# Patient Record
Sex: MALE | Race: WHITE
[De-identification: names, ages, dates, MRNs, and addresses within clinical notes are randomized per-mention and may not be internally consistent; named-entity substitution may affect disease eponyms.]

---

## 2021-08-23 ENCOUNTER — APPOINTMENT (OUTPATIENT)
Dept: DISASTER EMERGENCY | Facility: CLINIC | Age: 71
End: 2021-08-23

## 2021-08-23 DIAGNOSIS — Z01.818 ENCOUNTER FOR OTHER PREPROCEDURAL EXAMINATION: ICD-10-CM

## 2021-08-23 PROBLEM — Z00.00 ENCOUNTER FOR PREVENTIVE HEALTH EXAMINATION: Status: ACTIVE | Noted: 2021-08-23

## 2021-08-24 LAB — SARS-COV-2 N GENE NPH QL NAA+PROBE: NOT DETECTED

## 2021-08-26 ENCOUNTER — RESULT REVIEW (OUTPATIENT)
Age: 71
End: 2021-08-26

## 2021-08-26 ENCOUNTER — OUTPATIENT (OUTPATIENT)
Dept: OUTPATIENT SERVICES | Facility: HOSPITAL | Age: 71
LOS: 1 days | Discharge: HOME | End: 2021-08-26
Payer: COMMERCIAL

## 2021-08-26 VITALS
RESPIRATION RATE: 23 BRPM | TEMPERATURE: 98 F | HEART RATE: 83 BPM | DIASTOLIC BLOOD PRESSURE: 79 MMHG | HEIGHT: 72 IN | SYSTOLIC BLOOD PRESSURE: 145 MMHG | WEIGHT: 179.9 LBS | OXYGEN SATURATION: 100 %

## 2021-08-26 DIAGNOSIS — Z98.890 OTHER SPECIFIED POSTPROCEDURAL STATES: Chronic | ICD-10-CM

## 2021-08-26 DIAGNOSIS — Z86.010 PERSONAL HISTORY OF COLONIC POLYPS: ICD-10-CM

## 2021-08-26 PROCEDURE — C1889: CPT

## 2021-08-26 PROCEDURE — 82962 GLUCOSE BLOOD TEST: CPT

## 2021-08-26 PROCEDURE — 88305 TISSUE EXAM BY PATHOLOGIST: CPT

## 2021-08-26 PROCEDURE — 88305 TISSUE EXAM BY PATHOLOGIST: CPT | Mod: 26

## 2021-08-26 NOTE — ASU PATIENT PROFILE, ADULT - NSICDXPASTMEDICALHX_GEN_ALL_CORE_FT
PAST MEDICAL HISTORY:  H/O type 1 diabetes mellitus     HLD (hyperlipidemia)     HTN (hypertension)     Stage 1 chronic kidney disease

## 2021-09-01 DIAGNOSIS — I12.9 HYPERTENSIVE CHRONIC KIDNEY DISEASE WITH STAGE 1 THROUGH STAGE 4 CHRONIC KIDNEY DISEASE, OR UNSPECIFIED CHRONIC KIDNEY DISEASE: ICD-10-CM

## 2021-09-01 DIAGNOSIS — K21.9 GASTRO-ESOPHAGEAL REFLUX DISEASE WITHOUT ESOPHAGITIS: ICD-10-CM

## 2021-09-01 DIAGNOSIS — Z12.11 ENCOUNTER FOR SCREENING FOR MALIGNANT NEOPLASM OF COLON: ICD-10-CM

## 2021-09-01 DIAGNOSIS — D12.0 BENIGN NEOPLASM OF CECUM: ICD-10-CM

## 2021-09-01 DIAGNOSIS — E11.22 TYPE 2 DIABETES MELLITUS WITH DIABETIC CHRONIC KIDNEY DISEASE: ICD-10-CM

## 2021-09-01 DIAGNOSIS — Z86.010 PERSONAL HISTORY OF COLONIC POLYPS: ICD-10-CM

## 2021-09-01 DIAGNOSIS — E78.5 HYPERLIPIDEMIA, UNSPECIFIED: ICD-10-CM

## 2021-09-01 DIAGNOSIS — Z79.4 LONG TERM (CURRENT) USE OF INSULIN: ICD-10-CM

## 2021-09-01 DIAGNOSIS — N18.9 CHRONIC KIDNEY DISEASE, UNSPECIFIED: ICD-10-CM

## 2022-09-29 PROBLEM — Z86.39 PERSONAL HISTORY OF OTHER ENDOCRINE, NUTRITIONAL AND METABOLIC DISEASE: Chronic | Status: ACTIVE | Noted: 2021-08-26

## 2022-09-29 PROBLEM — E78.5 HYPERLIPIDEMIA, UNSPECIFIED: Chronic | Status: ACTIVE | Noted: 2021-08-26

## 2022-09-29 PROBLEM — I10 ESSENTIAL (PRIMARY) HYPERTENSION: Chronic | Status: ACTIVE | Noted: 2021-08-26

## 2022-09-29 PROBLEM — N18.1 CHRONIC KIDNEY DISEASE, STAGE 1: Chronic | Status: ACTIVE | Noted: 2021-08-26

## 2022-10-13 ENCOUNTER — OUTPATIENT (OUTPATIENT)
Dept: OUTPATIENT SERVICES | Facility: HOSPITAL | Age: 72
LOS: 1 days | Discharge: HOME | End: 2022-10-13
Payer: MEDICARE

## 2022-10-13 ENCOUNTER — RESULT REVIEW (OUTPATIENT)
Age: 72
End: 2022-10-13

## 2022-10-13 VITALS
TEMPERATURE: 98 F | WEIGHT: 190.04 LBS | RESPIRATION RATE: 20 BRPM | DIASTOLIC BLOOD PRESSURE: 84 MMHG | HEART RATE: 90 BPM | OXYGEN SATURATION: 100 % | SYSTOLIC BLOOD PRESSURE: 152 MMHG

## 2022-10-13 DIAGNOSIS — K21.9 GASTRO-ESOPHAGEAL REFLUX DISEASE WITHOUT ESOPHAGITIS: ICD-10-CM

## 2022-10-13 DIAGNOSIS — Z86.010 PERSONAL HISTORY OF COLONIC POLYPS: ICD-10-CM

## 2022-10-13 DIAGNOSIS — Z98.890 OTHER SPECIFIED POSTPROCEDURAL STATES: Chronic | ICD-10-CM

## 2022-10-13 PROCEDURE — 88305 TISSUE EXAM BY PATHOLOGIST: CPT

## 2022-10-13 PROCEDURE — 88305 TISSUE EXAM BY PATHOLOGIST: CPT | Mod: 26

## 2022-10-13 PROCEDURE — 88312 SPECIAL STAINS GROUP 1: CPT

## 2022-10-13 PROCEDURE — 88312 SPECIAL STAINS GROUP 1: CPT | Mod: 26

## 2022-10-13 RX ORDER — OMEPRAZOLE 10 MG/1
1 CAPSULE, DELAYED RELEASE ORAL
Qty: 0 | Refills: 0 | DISCHARGE

## 2022-10-13 RX ORDER — ENOXAPARIN SODIUM 100 MG/ML
6 INJECTION SUBCUTANEOUS
Qty: 0 | Refills: 0 | DISCHARGE

## 2022-10-13 RX ORDER — CHOLECALCIFEROL (VITAMIN D3) 125 MCG
1 CAPSULE ORAL
Qty: 0 | Refills: 0 | DISCHARGE

## 2022-10-13 RX ORDER — OMEGA-3 ACID ETHYL ESTERS 1 G
1 CAPSULE ORAL
Qty: 0 | Refills: 0 | DISCHARGE

## 2022-10-13 RX ORDER — LOSARTAN POTASSIUM 100 MG/1
1 TABLET, FILM COATED ORAL
Qty: 0 | Refills: 0 | DISCHARGE

## 2022-10-13 RX ORDER — HYDROXYCHLOROQUINE SULFATE 200 MG
1 TABLET ORAL
Qty: 0 | Refills: 0 | DISCHARGE

## 2022-10-13 RX ORDER — INSULIN ASPART 100 [IU]/ML
8 INJECTION, SOLUTION SUBCUTANEOUS
Qty: 0 | Refills: 0 | DISCHARGE

## 2022-10-13 NOTE — CHART NOTE - NSCHARTNOTEFT_GEN_A_CORE
Attending Physician:  Dirk Day MD                       Procedure: EGD/colonoscopy    Indication for Procedure: Dysphagia, colon polyps  ________________________________________________________  PAST MEDICAL & SURGICAL HISTORY:  Stage 1 chronic kidney disease      H/O type 1 diabetes mellitus      HLD (hyperlipidemia)      HTN (hypertension)      H/O knee surgery        ALLERGIES:  No Known Allergies    HOME MEDICATIONS:  Cialis 5 mg oral tablet: 1 tab(s) orally once a day  Cozaar 25 mg oral tablet: 1 tab(s) orally once a day  Fish Oil oral capsule: 1 cap(s) orally once a day  Lantus Solostar Pen 100 units/mL subcutaneous solution: 6 unit(s) subcutaneous once a day  Lipitor 10 mg oral tablet: 1 tab(s) orally once a day  NovoLOG FlexPen 100 units/mL injectable solution: 8 unit(s) injectable 3 times a day  omeprazole 40 mg oral delayed release capsule: 1 cap(s) orally once a day  Plaquenil 200 mg oral tablet: 1 tab(s) orally once a day  Tylenol PM 1000 mg-50 mg/30 mL oral liquid: 30 milliliter(s) orally once a day (at bedtime)  Vitamin D3 25 mcg (1000 intl units) oral capsule: 1 cap(s) orally once a day    AICD/PPM: [ ] yes   [ ] no    PERTINENT LAB DATA:      PHYSICAL EXAMINATION:      Weight (kg): 86.2T(C): 36.9  HR: 90  BP: 152/84  RR: 20  SpO2: 100%    Constitutional: NAD    Neck:  No JVD  Respiratory: CTAB/L  Cardiovascular: S1 and S2  Gastrointestinal: BS+, soft, NT/ND  Extremities: No peripheral edema  Neurological: A/O x 3, no focal deficits        COMMENTS:    The patient is a suitable candidate for the planned procedure unless box checked [ ]  No, explain:

## 2022-10-17 LAB — SURGICAL PATHOLOGY STUDY: SIGNIFICANT CHANGE UP

## 2022-10-18 DIAGNOSIS — E78.00 PURE HYPERCHOLESTEROLEMIA, UNSPECIFIED: ICD-10-CM

## 2022-10-18 DIAGNOSIS — D12.5 BENIGN NEOPLASM OF SIGMOID COLON: ICD-10-CM

## 2022-10-18 DIAGNOSIS — I12.9 HYPERTENSIVE CHRONIC KIDNEY DISEASE WITH STAGE 1 THROUGH STAGE 4 CHRONIC KIDNEY DISEASE, OR UNSPECIFIED CHRONIC KIDNEY DISEASE: ICD-10-CM

## 2022-10-18 DIAGNOSIS — K64.0 FIRST DEGREE HEMORRHOIDS: ICD-10-CM

## 2022-10-18 DIAGNOSIS — Z86.010 PERSONAL HISTORY OF COLONIC POLYPS: ICD-10-CM

## 2022-10-18 DIAGNOSIS — Z79.4 LONG TERM (CURRENT) USE OF INSULIN: ICD-10-CM

## 2022-10-18 DIAGNOSIS — K44.9 DIAPHRAGMATIC HERNIA WITHOUT OBSTRUCTION OR GANGRENE: ICD-10-CM

## 2022-10-18 DIAGNOSIS — E11.40 TYPE 2 DIABETES MELLITUS WITH DIABETIC NEUROPATHY, UNSPECIFIED: ICD-10-CM

## 2022-10-18 DIAGNOSIS — R13.10 DYSPHAGIA, UNSPECIFIED: ICD-10-CM

## 2022-10-18 DIAGNOSIS — K29.50 UNSPECIFIED CHRONIC GASTRITIS WITHOUT BLEEDING: ICD-10-CM

## 2022-10-18 DIAGNOSIS — D12.2 BENIGN NEOPLASM OF ASCENDING COLON: ICD-10-CM

## 2022-10-18 DIAGNOSIS — E11.22 TYPE 2 DIABETES MELLITUS WITH DIABETIC CHRONIC KIDNEY DISEASE: ICD-10-CM

## 2022-10-18 DIAGNOSIS — K21.9 GASTRO-ESOPHAGEAL REFLUX DISEASE WITHOUT ESOPHAGITIS: ICD-10-CM

## 2022-10-18 DIAGNOSIS — N18.9 CHRONIC KIDNEY DISEASE, UNSPECIFIED: ICD-10-CM

## 2022-11-05 ENCOUNTER — INPATIENT (INPATIENT)
Facility: HOSPITAL | Age: 72
LOS: 1 days | Discharge: HOME | DRG: 247 | End: 2022-11-07
Attending: INTERNAL MEDICINE | Admitting: INTERNAL MEDICINE
Payer: MEDICARE

## 2022-11-05 VITALS — WEIGHT: 190.04 LBS | HEIGHT: 72 IN

## 2022-11-05 DIAGNOSIS — N17.9 ACUTE KIDNEY FAILURE, UNSPECIFIED: ICD-10-CM

## 2022-11-05 DIAGNOSIS — I21.3 ST ELEVATION (STEMI) MYOCARDIAL INFARCTION OF UNSPECIFIED SITE: ICD-10-CM

## 2022-11-05 DIAGNOSIS — Z98.890 OTHER SPECIFIED POSTPROCEDURAL STATES: Chronic | ICD-10-CM

## 2022-11-05 DIAGNOSIS — R77.8 OTHER SPECIFIED ABNORMALITIES OF PLASMA PROTEINS: ICD-10-CM

## 2022-11-05 LAB
ALBUMIN SERPL ELPH-MCNC: 4 G/DL — SIGNIFICANT CHANGE UP (ref 3.3–5)
ALP SERPL-CCNC: 73 U/L — SIGNIFICANT CHANGE UP (ref 40–120)
ALT FLD-CCNC: 40 U/L — SIGNIFICANT CHANGE UP (ref 12–78)
ANION GAP SERPL CALC-SCNC: 7 MMOL/L — SIGNIFICANT CHANGE UP (ref 5–17)
APTT BLD: 32.4 SEC — SIGNIFICANT CHANGE UP (ref 27.5–35.5)
AST SERPL-CCNC: 121 U/L — HIGH (ref 15–37)
BASOPHILS # BLD AUTO: 0.02 K/UL — SIGNIFICANT CHANGE UP (ref 0–0.2)
BASOPHILS NFR BLD AUTO: 0.2 % — SIGNIFICANT CHANGE UP (ref 0–2)
BILIRUB SERPL-MCNC: 0.5 MG/DL — SIGNIFICANT CHANGE UP (ref 0.2–1.2)
BUN SERPL-MCNC: 30 MG/DL — HIGH (ref 7–23)
CALCIUM SERPL-MCNC: 9.3 MG/DL — SIGNIFICANT CHANGE UP (ref 8.5–10.1)
CHLORIDE SERPL-SCNC: 103 MMOL/L — SIGNIFICANT CHANGE UP (ref 96–108)
CO2 SERPL-SCNC: 25 MMOL/L — SIGNIFICANT CHANGE UP (ref 22–31)
CREAT SERPL-MCNC: 1.71 MG/DL — HIGH (ref 0.5–1.3)
EGFR: 42 ML/MIN/1.73M2 — LOW
EOSINOPHIL # BLD AUTO: 0.04 K/UL — SIGNIFICANT CHANGE UP (ref 0–0.5)
EOSINOPHIL NFR BLD AUTO: 0.4 % — SIGNIFICANT CHANGE UP (ref 0–6)
FLUAV AG NPH QL: SIGNIFICANT CHANGE UP
FLUBV AG NPH QL: SIGNIFICANT CHANGE UP
GLUCOSE SERPL-MCNC: 270 MG/DL — HIGH (ref 70–99)
HCT VFR BLD CALC: 41.6 % — SIGNIFICANT CHANGE UP (ref 39–50)
HGB BLD-MCNC: 13.5 G/DL — SIGNIFICANT CHANGE UP (ref 13–17)
IMM GRANULOCYTES NFR BLD AUTO: 0.3 % — SIGNIFICANT CHANGE UP (ref 0–0.9)
INR BLD: 1.13 RATIO — SIGNIFICANT CHANGE UP (ref 0.88–1.16)
LYMPHOCYTES # BLD AUTO: 0.92 K/UL — LOW (ref 1–3.3)
LYMPHOCYTES # BLD AUTO: 9.2 % — LOW (ref 13–44)
MAGNESIUM SERPL-MCNC: 2.2 MG/DL — SIGNIFICANT CHANGE UP (ref 1.6–2.6)
MCHC RBC-ENTMCNC: 30.5 PG — SIGNIFICANT CHANGE UP (ref 27–34)
MCHC RBC-ENTMCNC: 32.5 GM/DL — SIGNIFICANT CHANGE UP (ref 32–36)
MCV RBC AUTO: 93.9 FL — SIGNIFICANT CHANGE UP (ref 80–100)
MONOCYTES # BLD AUTO: 0.72 K/UL — SIGNIFICANT CHANGE UP (ref 0–0.9)
MONOCYTES NFR BLD AUTO: 7.2 % — SIGNIFICANT CHANGE UP (ref 2–14)
NEUTROPHILS # BLD AUTO: 8.31 K/UL — HIGH (ref 1.8–7.4)
NEUTROPHILS NFR BLD AUTO: 82.7 % — HIGH (ref 43–77)
NT-PROBNP SERPL-SCNC: 3081 PG/ML — HIGH (ref 0–125)
PLATELET # BLD AUTO: 249 K/UL — SIGNIFICANT CHANGE UP (ref 150–400)
POTASSIUM SERPL-MCNC: 4 MMOL/L — SIGNIFICANT CHANGE UP (ref 3.5–5.3)
POTASSIUM SERPL-SCNC: 4 MMOL/L — SIGNIFICANT CHANGE UP (ref 3.5–5.3)
PROT SERPL-MCNC: 8.6 GM/DL — HIGH (ref 6–8.3)
PROTHROM AB SERPL-ACNC: 13.1 SEC — SIGNIFICANT CHANGE UP (ref 10.5–13.4)
RBC # BLD: 4.43 M/UL — SIGNIFICANT CHANGE UP (ref 4.2–5.8)
RBC # FLD: 12.7 % — SIGNIFICANT CHANGE UP (ref 10.3–14.5)
RSV RNA NPH QL NAA+NON-PROBE: SIGNIFICANT CHANGE UP
SARS-COV-2 RNA SPEC QL NAA+PROBE: SIGNIFICANT CHANGE UP
SODIUM SERPL-SCNC: 135 MMOL/L — SIGNIFICANT CHANGE UP (ref 135–145)
TROPONIN I, HIGH SENSITIVITY RESULT: HIGH NG/L
TROPONIN I, HIGH SENSITIVITY RESULT: HIGH NG/L
WBC # BLD: 10.04 K/UL — SIGNIFICANT CHANGE UP (ref 3.8–10.5)
WBC # FLD AUTO: 10.04 K/UL — SIGNIFICANT CHANGE UP (ref 3.8–10.5)

## 2022-11-05 PROCEDURE — C9606: CPT | Mod: RC

## 2022-11-05 PROCEDURE — 80061 LIPID PANEL: CPT

## 2022-11-05 PROCEDURE — 85027 COMPLETE CBC AUTOMATED: CPT

## 2022-11-05 PROCEDURE — 36415 COLL VENOUS BLD VENIPUNCTURE: CPT

## 2022-11-05 PROCEDURE — 82962 GLUCOSE BLOOD TEST: CPT

## 2022-11-05 PROCEDURE — 85025 COMPLETE CBC W/AUTO DIFF WBC: CPT

## 2022-11-05 PROCEDURE — 93005 ELECTROCARDIOGRAM TRACING: CPT

## 2022-11-05 PROCEDURE — 93306 TTE W/DOPPLER COMPLETE: CPT | Mod: 26

## 2022-11-05 PROCEDURE — 84100 ASSAY OF PHOSPHORUS: CPT

## 2022-11-05 PROCEDURE — 80048 BASIC METABOLIC PNL TOTAL CA: CPT

## 2022-11-05 PROCEDURE — 82550 ASSAY OF CK (CPK): CPT

## 2022-11-05 PROCEDURE — 71045 X-RAY EXAM CHEST 1 VIEW: CPT | Mod: 26

## 2022-11-05 PROCEDURE — 99285 EMERGENCY DEPT VISIT HI MDM: CPT

## 2022-11-05 PROCEDURE — 83036 HEMOGLOBIN GLYCOSYLATED A1C: CPT

## 2022-11-05 PROCEDURE — 99232 SBSQ HOSP IP/OBS MODERATE 35: CPT

## 2022-11-05 PROCEDURE — 93306 TTE W/DOPPLER COMPLETE: CPT

## 2022-11-05 PROCEDURE — 84484 ASSAY OF TROPONIN QUANT: CPT

## 2022-11-05 PROCEDURE — 93458 L HRT ARTERY/VENTRICLE ANGIO: CPT | Mod: 59

## 2022-11-05 PROCEDURE — 83735 ASSAY OF MAGNESIUM: CPT

## 2022-11-05 PROCEDURE — 93010 ELECTROCARDIOGRAM REPORT: CPT | Mod: 76

## 2022-11-05 PROCEDURE — 71045 X-RAY EXAM CHEST 1 VIEW: CPT

## 2022-11-05 RX ORDER — GLUCAGON INJECTION, SOLUTION 0.5 MG/.1ML
1 INJECTION, SOLUTION SUBCUTANEOUS ONCE
Refills: 0 | Status: DISCONTINUED | OUTPATIENT
Start: 2022-11-05 | End: 2022-11-07

## 2022-11-05 RX ORDER — DEXTROSE 50 % IN WATER 50 %
15 SYRINGE (ML) INTRAVENOUS ONCE
Refills: 0 | Status: DISCONTINUED | OUTPATIENT
Start: 2022-11-05 | End: 2022-11-07

## 2022-11-05 RX ORDER — ATORVASTATIN CALCIUM 80 MG/1
80 TABLET, FILM COATED ORAL AT BEDTIME
Refills: 0 | Status: DISCONTINUED | OUTPATIENT
Start: 2022-11-05 | End: 2022-11-07

## 2022-11-05 RX ORDER — SODIUM CHLORIDE 9 MG/ML
1000 INJECTION INTRAMUSCULAR; INTRAVENOUS; SUBCUTANEOUS
Refills: 0 | Status: DISCONTINUED | OUTPATIENT
Start: 2022-11-05 | End: 2022-11-07

## 2022-11-05 RX ORDER — DEXTROSE 50 % IN WATER 50 %
25 SYRINGE (ML) INTRAVENOUS ONCE
Refills: 0 | Status: DISCONTINUED | OUTPATIENT
Start: 2022-11-05 | End: 2022-11-07

## 2022-11-05 RX ORDER — CLOPIDOGREL BISULFATE 75 MG/1
75 TABLET, FILM COATED ORAL DAILY
Refills: 0 | Status: DISCONTINUED | OUTPATIENT
Start: 2022-11-06 | End: 2022-11-07

## 2022-11-05 RX ORDER — ASPIRIN/CALCIUM CARB/MAGNESIUM 324 MG
81 TABLET ORAL DAILY
Refills: 0 | Status: DISCONTINUED | OUTPATIENT
Start: 2022-11-06 | End: 2022-11-07

## 2022-11-05 RX ORDER — METOPROLOL TARTRATE 50 MG
50 TABLET ORAL
Refills: 0 | Status: DISCONTINUED | OUTPATIENT
Start: 2022-11-05 | End: 2022-11-06

## 2022-11-05 RX ORDER — INSULIN GLARGINE 100 [IU]/ML
10 INJECTION, SOLUTION SUBCUTANEOUS ONCE
Refills: 0 | Status: COMPLETED | OUTPATIENT
Start: 2022-11-05 | End: 2022-11-05

## 2022-11-05 RX ORDER — ASPIRIN/CALCIUM CARB/MAGNESIUM 324 MG
324 TABLET ORAL ONCE
Refills: 0 | Status: COMPLETED | OUTPATIENT
Start: 2022-11-05 | End: 2022-11-05

## 2022-11-05 RX ORDER — DEXTROSE 50 % IN WATER 50 %
12.5 SYRINGE (ML) INTRAVENOUS ONCE
Refills: 0 | Status: DISCONTINUED | OUTPATIENT
Start: 2022-11-05 | End: 2022-11-07

## 2022-11-05 RX ORDER — CLOPIDOGREL BISULFATE 75 MG/1
300 TABLET, FILM COATED ORAL ONCE
Refills: 0 | Status: COMPLETED | OUTPATIENT
Start: 2022-11-05 | End: 2022-11-05

## 2022-11-05 RX ORDER — INSULIN LISPRO 100/ML
VIAL (ML) SUBCUTANEOUS
Refills: 0 | Status: DISCONTINUED | OUTPATIENT
Start: 2022-11-05 | End: 2022-11-06

## 2022-11-05 RX ORDER — SODIUM CHLORIDE 9 MG/ML
1000 INJECTION, SOLUTION INTRAVENOUS
Refills: 0 | Status: DISCONTINUED | OUTPATIENT
Start: 2022-11-05 | End: 2022-11-07

## 2022-11-05 RX ORDER — HEPARIN SODIUM 5000 [USP'U]/ML
5000 INJECTION INTRAVENOUS; SUBCUTANEOUS ONCE
Refills: 0 | Status: COMPLETED | OUTPATIENT
Start: 2022-11-05 | End: 2022-11-05

## 2022-11-05 RX ORDER — INSULIN LISPRO 100/ML
VIAL (ML) SUBCUTANEOUS AT BEDTIME
Refills: 0 | Status: DISCONTINUED | OUTPATIENT
Start: 2022-11-05 | End: 2022-11-06

## 2022-11-05 RX ORDER — LANOLIN ALCOHOL/MO/W.PET/CERES
5 CREAM (GRAM) TOPICAL AT BEDTIME
Refills: 0 | Status: DISCONTINUED | OUTPATIENT
Start: 2022-11-05 | End: 2022-11-07

## 2022-11-05 RX ADMIN — HEPARIN SODIUM 5000 UNIT(S): 5000 INJECTION INTRAVENOUS; SUBCUTANEOUS at 12:12

## 2022-11-05 RX ADMIN — ATORVASTATIN CALCIUM 80 MILLIGRAM(S): 80 TABLET, FILM COATED ORAL at 21:35

## 2022-11-05 RX ADMIN — Medication 324 MILLIGRAM(S): at 12:12

## 2022-11-05 RX ADMIN — INSULIN GLARGINE 10 UNIT(S): 100 INJECTION, SOLUTION SUBCUTANEOUS at 22:16

## 2022-11-05 RX ADMIN — SODIUM CHLORIDE 100 MILLILITER(S): 9 INJECTION INTRAMUSCULAR; INTRAVENOUS; SUBCUTANEOUS at 14:57

## 2022-11-05 RX ADMIN — Medication 5 MILLIGRAM(S): at 21:35

## 2022-11-05 RX ADMIN — CLOPIDOGREL BISULFATE 300 MILLIGRAM(S): 75 TABLET, FILM COATED ORAL at 12:20

## 2022-11-05 RX ADMIN — CLOPIDOGREL BISULFATE 300 MILLIGRAM(S): 75 TABLET, FILM COATED ORAL at 12:12

## 2022-11-05 RX ADMIN — Medication 6: at 18:47

## 2022-11-05 NOTE — ED PROVIDER NOTE - OBJECTIVE STATEMENT
73 y/o male with a PMHx of DM presents to the ED for CP for 3 days. Pt states that at 10PM yesterday night pain has increased and radiated down his left arm. No history of heart disease. Pt believes that he does not take any anticoagulants. Found to have a STEMI on EKG in ED.  GARRETT 71 y/o male with a PMHx of DM presents to the ED for CP for 3 days. Pt states that at 10PM yesterday night, pain has increased and radiated down his left arm. No history of heart disease. Pt believes that he does not take any anticoagulants. Found to have a STEMI on EKG in ED.  GARRETT

## 2022-11-05 NOTE — PATIENT PROFILE ADULT - FALL HARM RISK - UNIVERSAL INTERVENTIONS
Bed in lowest position, wheels locked, appropriate side rails in place/Call bell, personal items and telephone in reach/Instruct patient to call for assistance before getting out of bed or chair/Non-slip footwear when patient is out of bed/[place] to call system/Physically safe environment - no spills, clutter or unnecessary equipment/Purposeful Proactive Rounding/Room/bathroom lighting operational, light cord in reach

## 2022-11-05 NOTE — ED ADULT TRIAGE NOTE - PRO INTERPRETER NEED 2
Health Maintenance Due   Topic Date Due    TETANUS VACCINE  07/10/1985    Pneumococcal PPSV23 (Medium Risk) (1) 07/10/1985    Mammogram  07/10/2007    Lipid Panel  07/16/2012    Colonoscopy  07/10/2017    Influenza Vaccine  08/01/2018       
English

## 2022-11-05 NOTE — ED PROVIDER NOTE - PROGRESS NOTE DETAILS
Sundeep Welsh: Dr. Santos made aware and is coming down in hospital. patient to go for cath, waiting on nursing upstairs to be ready. dr. umaña rec heparin/asa/plavix. Dirk Welsh MD.

## 2022-11-05 NOTE — PROGRESS NOTE ADULT - SUBJECTIVE AND OBJECTIVE BOX
72y old  Male who presents with a chief complaint of chest pain. Off and on for the last 2-3 days but worse last night and today AM. Came to ER and EKG showed ST elevations in the inferior leads.   Case discussed- s/p 3 stents RCA      ICU Vital Signs Last 24 Hrs  T(C): 36.1 (05 Nov 2022 13:50), Max: 36.9 (05 Nov 2022 11:34)  T(F): 97 (05 Nov 2022 13:50), Max: 98.5 (05 Nov 2022 11:34)  HR: 74 (05 Nov 2022 14:20) (73 - 84)  BP: 102/57 (05 Nov 2022 14:20) (102/57 - 149/81)  BP(mean): 63 (05 Nov 2022 14:20) (63 - 101)  RR: 11 (05 Nov 2022 13:50) (11 - 16)  SpO2: 95% (05 Nov 2022 14:20) (95% - 100%)    O2 Parameters below as of 05 Nov 2022 11:34  Patient On (Oxygen Delivery Method): room air                            13.5   10.04 )-----------( 249      ( 05 Nov 2022 11:47 )             41.6         11-05    135  |  103  |  30<H>  ----------------------------<  270<H>  4.0   |  25  |  1.71<H>    Ca    9.3      05 Nov 2022 11:47  Mg     2.2     11-05    TPro  8.6<H>  /  Alb  4.0  /  TBili  0.5  /  DBili  x   /  AST  121<H>  /  ALT  40  /  AlkPhos  73  11-05    LIVER FUNCTIONS - ( 05 Nov 2022 11:47 )  Alb: 4.0 g/dL / Pro: 8.6 gm/dL / ALK PHOS: 73 U/L / ALT: 40 U/L / AST: 121 U/L / GGT: x             PT/INR - ( 05 Nov 2022 11:47 )   PT: 13.1 sec;   INR: 1.13 ratio         PTT - ( 05 Nov 2022 11:47 )  PTT:32.4 sec

## 2022-11-05 NOTE — ED PROVIDER NOTE - PHYSICAL EXAMINATION
I have reviewed discharge instructions with the patient. The patient verbalized understanding. Discharge medications reviewed with patient and appropriate educational materials and side effects teaching were provided. Patient armband removed and shredded Constitutional: Awake, Alert, non-toxic. No acute distress. Well appearing, well nourished.   HEAD: Normocephalic, atraumatic.   EYES: PERRL, EOM intact, conjunctiva and sclera are clear bilaterally.  ENT: External ears normal. No rhinorrhea, no tracheal deviation   NECK: Supple, non-tender  CARDIOVASCULAR: regular rate and rhythm.  RESPIRATORY: Normal respiratory effort; breath sounds CTAB, no wheezes, rhonchi, or rales. Speaking in full sentences. No accessory muscle use.   ABDOMEN: Soft; non-tender, non-distended. No rebound or guarding.   EXTREMITIES:  no lower extremity edema, no deformities  SKIN: Warm, dry  NEURO: A&O x3. Sensory and motor functions are grossly intact. Speech is normal. No facial droop.  PSYCH: Appearance and judgement seem appropriate for gender and age.

## 2022-11-05 NOTE — CONSULT NOTE ADULT - SUBJECTIVE AND OBJECTIVE BOX
Patient is a 72y old  Male who presents with a chief complaint of     HPI:      PAST MEDICAL & SURGICAL HISTORY:  Stage 1 chronic kidney disease      H/O type 1 diabetes mellitus      HLD (hyperlipidemia)      HTN (hypertension)      H/O knee surgery          PREVIOUS CARDIAC WORKUP:      Echo:  Stress Test:  Cardiac Cath:    ALLERGIES:    No Known Allergies       MEDICATIONS  (STANDING):  aspirin  chewable 324 milliGRAM(s) Oral once  clopidogrel Tablet 300 milliGRAM(s) Oral Once  clopidogrel Tablet 300 milliGRAM(s) Oral Once  heparin   Injectable 5000 Unit(s) IV Push once    MEDICATIONS  (PRN):      FAMILY HISTORY:        SOCIAL HISTORY:  .scl     ROS:     .ros    Vital Signs Last 24 Hrs  T(C): 36.9 (05 Nov 2022 11:34), Max: 36.9 (05 Nov 2022 11:34)  T(F): 98.5 (05 Nov 2022 11:34), Max: 98.5 (05 Nov 2022 11:34)  HR: 84 (05 Nov 2022 11:34) (84 - 84)  BP: 149/81 (05 Nov 2022 11:34) (149/81 - 149/81)  BP(mean): 101 (05 Nov 2022 11:34) (101 - 101)  RR: 16 (05 Nov 2022 11:34) (16 - 16)  SpO2: 100% (05 Nov 2022 11:34) (100% - 100%)    Parameters below as of 05 Nov 2022 11:34  Patient On (Oxygen Delivery Method): room air        I&O's Summary      PHYSICAL EXAM:    .phy      TELEMETRY:    ECG:    LABS:                          13.5   10.04 )-----------( 249      ( 05 Nov 2022 11:47 )             41.6                                 RADIOLOGY & ADDITIONAL STUDIES:     Patient is a 72y old  Male who presents with a chief complaint of chest pain. Off and on for the last 2-3 days but worse last night and today AM. Came to ER and EKG showed ST elevations in the inferior leads.       PAST MEDICAL & SURGICAL HISTORY:  Stage 1 chronic kidney disease  H/O type 1 diabetes mellitus  HLD (hyperlipidemia)  HTN (hypertension)  H/O knee surgery      PREVIOUS CARDIAC WORKUP:    None      ALLERGIES:    No Known Allergies       MEDICATIONS  (STANDING):  aspirin  chewable 324 milliGRAM(s) Oral once  clopidogrel Tablet 300 milliGRAM(s) Oral Once  clopidogrel Tablet 300 milliGRAM(s) Oral Once  heparin   Injectable 5000 Unit(s) IV Push once    MEDICATIONS  (PRN):      FAMILY HISTORY:   Positive for early onset CAD         SOCIAL HISTORY:  Nonsmoker. No ETOH abuse. No illicit drugs.       ROS:     Detailed ten system ROS was performed and was negative except for history as eluded to above.    no fever  no chills  no nausea  no vomiting  no diarrhea  no constipation  no melena  no hematochezia  + chest pain  no palpitations  no sob at rest  no dyspnea on exertion  no cough  no wheezing  no anorexia  no headache  no dizziness  no syncope  no weakness  no myalgia  no dysuria  no polyuria  no hematuria         Vital Signs Last 24 Hrs  T(C): 36.9 (05 Nov 2022 11:34), Max: 36.9 (05 Nov 2022 11:34)  T(F): 98.5 (05 Nov 2022 11:34), Max: 98.5 (05 Nov 2022 11:34)  HR: 84 (05 Nov 2022 11:34) (84 - 84)  BP: 149/81 (05 Nov 2022 11:34) (149/81 - 149/81)  BP(mean): 101 (05 Nov 2022 11:34) (101 - 101)  RR: 16 (05 Nov 2022 11:34) (16 - 16)  SpO2: 100% (05 Nov 2022 11:34) (100% - 100%)    Parameters below as of 05 Nov 2022 11:34  Patient On (Oxygen Delivery Method): room air        PHYSICAL EXAM:    General:                Comfortable, AAO X 3, in no distress.   HEENT:                  Atraumatic, PERRLA, EOMI, conjunctiva clear.   Neck:                     Supple, no adenopathy, no thyromegaly, no JVD, no bruit.  Chest:                    Clear, B/L symmetric air entry, no tachypnea  Heart:                     S1, S2 normal, no gallop, no murmur.  Abdomen:              Soft, non-tender, bowel sounds active. No palpable masses.  Extremities:           no cyanosis, no edema. Peripheral pulses normal.  Skin:                      Skin color, texture normal. No rashes.  Neurologic:            Grossly nonfocal.       TELEMETRY:   Normal sinus rhythm with no tachy or osmin events     ECG:   Sinus. Inferior Q waves, ST elevation    LABS:                          13.5   10.04 )-----------( 249      ( 05 Nov 2022 11:47 )             41.6

## 2022-11-05 NOTE — CONSULT NOTE ADULT - ASSESSMENT
Inferior STEMI  DM  HTN  HLD    Suggest:    Cardiac monitor  O2 supplement  Treat with aspirin, Plavix  IV Heparin bolus given in ER  Beta blockers  Based on pt's symptoms, history, risk factors, exam, lab and EKG data I have advised pt have a cardiac catheterization and possible percutaneous coronary intervention. Procedure, its risks, alternatives, benefits and potential complications were discussed in detail. Risks include but not limited to bleeding, infection, allergy, renal failure requiring dialysis, stroke, vascular injury, pericardial tamponade, arrhythmias, MI and even death. Pt is agreeable and has consented for the procedure and the procedure is being scheduled urgently.  Further treatment orders after cardiac cath, meanwhile continue current treatment with aspirin and plavix. Keep pain free with Morphine as needed and tolerated. Keep optimal BP and HR.  Admit to CCU.   Follow up Cardiac enzymes  Statins  DAPT after PCI performed  Echocardiogram  Inferior STEMI  DM  HTN  HLD    Suggest:    Cardiac monitor  O2 supplement  Treat with aspirin, Plavix  IV Heparin bolus given in ER  Beta blockers  Based on pt's symptoms, history, risk factors, exam, lab and EKG data I have advised pt have a cardiac catheterization and possible percutaneous coronary intervention. Procedure, its risks, alternatives, benefits and potential complications were discussed in detail. Risks include but not limited to bleeding, infection, allergy, renal failure requiring dialysis, stroke, vascular injury, pericardial tamponade, arrhythmias, MI and even death. Pt is agreeable and has consented for the procedure and the procedure is being scheduled urgently.  Further treatment orders after cardiac cath, meanwhile continue current treatment with aspirin and plavix. Keep pain free with Morphine as needed and tolerated. Keep optimal BP and HR.  H/O CKD. Follow renal functio. Pt understands risk of worsening renal function and even HD after cardiac cath.   Admit to CCU.   Follow up Cardiac enzymes  Statins  DAPT after PCI performed  Echocardiogram   D/W pt's wife at bedside.

## 2022-11-05 NOTE — ED ADULT TRIAGE NOTE - CHIEF COMPLAINT QUOTE
Pt comes to the ED complaining of chest pain x 3 days. Pt states that since 10pm last night the pain has been increased.

## 2022-11-05 NOTE — ED PROVIDER NOTE - NS ED ROS FT
Constitutional: negative for fevers/chills  HENT: no hearing problems, sore throat, nasal congestion  Eyes: no visual disturbances  Neck: no neck stiffness or neck pain  Cardiovascular: (+) chest pain   Respiratory: no cough, no shortness of breath  Musculoskeletal: no back pain, no pain of extremities  Gastrointestinal: no abdominal pain, nausea, vomiting  : no dysuria or hematuria or polyuria  Neuro: no headaches, no numbness or tingling, no dizziness  Skin: no rashes or wounds

## 2022-11-05 NOTE — ED PROVIDER NOTE - CLINICAL SUMMARY MEDICAL DECISION MAKING FREE TEXT BOX
Patient presents via EMS for c/o nausea and vomiting. Patient has heartmate 2 LVAD. Patient and wife report nausea and vomiting since yesterday morning. Wife also reports temp of 100 this morning, took tylenol around 0630. Temp 98.3 during triage. Patient has abdominal incision with wound vac, completed course of antibiotics last month.  per EMS.   Pt found to have inferior STEMI on EKG. Cardiology made aware upon initial evaluation. Pt's vital signs stable at this time. Hospitalist also at bedside to evaluate pt. Likely to go to cath lab today. Will hold Asprin, antiplatelet agent, heparin pending interventional cardiology recommendation. Pt found to have inferior STEMI on EKG. Cardiology made aware upon initial evaluation. Pt's vital signs stable at this time. Hospitalist also at bedside to evaluate pt. Likely to go to cath lab from ed. Will hold Asprin, antiplatelet agent, heparin pending interventional cardiology recommendation.

## 2022-11-06 LAB
A1C WITH ESTIMATED AVERAGE GLUCOSE RESULT: 7 % — HIGH (ref 4–5.6)
ANION GAP SERPL CALC-SCNC: 4 MMOL/L — LOW (ref 5–17)
BUN SERPL-MCNC: 25 MG/DL — HIGH (ref 7–23)
CALCIUM SERPL-MCNC: 8.3 MG/DL — LOW (ref 8.5–10.1)
CHLORIDE SERPL-SCNC: 107 MMOL/L — SIGNIFICANT CHANGE UP (ref 96–108)
CHOLEST SERPL-MCNC: 127 MG/DL — SIGNIFICANT CHANGE UP
CO2 SERPL-SCNC: 27 MMOL/L — SIGNIFICANT CHANGE UP (ref 22–31)
CREAT SERPL-MCNC: 1.47 MG/DL — HIGH (ref 0.5–1.3)
EGFR: 50 ML/MIN/1.73M2 — LOW
ESTIMATED AVERAGE GLUCOSE: 154 MG/DL — HIGH (ref 68–114)
GLUCOSE SERPL-MCNC: 224 MG/DL — HIGH (ref 70–99)
HCT VFR BLD CALC: 32.9 % — LOW (ref 39–50)
HDLC SERPL-MCNC: 64 MG/DL — SIGNIFICANT CHANGE UP
HGB BLD-MCNC: 10.9 G/DL — LOW (ref 13–17)
LIPID PNL WITH DIRECT LDL SERPL: 52 MG/DL — SIGNIFICANT CHANGE UP
MCHC RBC-ENTMCNC: 31.2 PG — SIGNIFICANT CHANGE UP (ref 27–34)
MCHC RBC-ENTMCNC: 33.1 GM/DL — SIGNIFICANT CHANGE UP (ref 32–36)
MCV RBC AUTO: 94.3 FL — SIGNIFICANT CHANGE UP (ref 80–100)
NON HDL CHOLESTEROL: 63 MG/DL — SIGNIFICANT CHANGE UP
PLATELET # BLD AUTO: 167 K/UL — SIGNIFICANT CHANGE UP (ref 150–400)
POTASSIUM SERPL-MCNC: 4 MMOL/L — SIGNIFICANT CHANGE UP (ref 3.5–5.3)
POTASSIUM SERPL-SCNC: 4 MMOL/L — SIGNIFICANT CHANGE UP (ref 3.5–5.3)
RBC # BLD: 3.49 M/UL — LOW (ref 4.2–5.8)
RBC # FLD: 12.8 % — SIGNIFICANT CHANGE UP (ref 10.3–14.5)
SODIUM SERPL-SCNC: 138 MMOL/L — SIGNIFICANT CHANGE UP (ref 135–145)
TRIGL SERPL-MCNC: 56 MG/DL — SIGNIFICANT CHANGE UP
TROPONIN I, HIGH SENSITIVITY RESULT: HIGH NG/L
WBC # BLD: 7.74 K/UL — SIGNIFICANT CHANGE UP (ref 3.8–10.5)
WBC # FLD AUTO: 7.74 K/UL — SIGNIFICANT CHANGE UP (ref 3.8–10.5)

## 2022-11-06 RX ORDER — METOPROLOL TARTRATE 50 MG
12.5 TABLET ORAL
Refills: 0 | Status: DISCONTINUED | OUTPATIENT
Start: 2022-11-06 | End: 2022-11-07

## 2022-11-06 RX ORDER — PANTOPRAZOLE SODIUM 20 MG/1
40 TABLET, DELAYED RELEASE ORAL
Refills: 0 | Status: DISCONTINUED | OUTPATIENT
Start: 2022-11-06 | End: 2022-11-07

## 2022-11-06 RX ORDER — INSULIN LISPRO 100/ML
VIAL (ML) SUBCUTANEOUS
Refills: 0 | Status: DISCONTINUED | OUTPATIENT
Start: 2022-11-06 | End: 2022-11-07

## 2022-11-06 RX ADMIN — Medication 5 MILLIGRAM(S): at 21:34

## 2022-11-06 RX ADMIN — ATORVASTATIN CALCIUM 80 MILLIGRAM(S): 80 TABLET, FILM COATED ORAL at 21:22

## 2022-11-06 RX ADMIN — Medication 8: at 21:34

## 2022-11-06 RX ADMIN — Medication 4: at 13:11

## 2022-11-06 RX ADMIN — Medication 12.5 MILLIGRAM(S): at 21:22

## 2022-11-06 RX ADMIN — CLOPIDOGREL BISULFATE 75 MILLIGRAM(S): 75 TABLET, FILM COATED ORAL at 09:02

## 2022-11-06 RX ADMIN — Medication 81 MILLIGRAM(S): at 09:02

## 2022-11-06 RX ADMIN — Medication 4: at 07:22

## 2022-11-06 RX ADMIN — Medication 4: at 18:41

## 2022-11-06 NOTE — PROGRESS NOTE ADULT - SUBJECTIVE AND OBJECTIVE BOX
72y old  Male who presents with a chief complaint of chest pain. Off and on for the last 2-3 days but worse last night and today AM. Came to ER and EKG showed ST elevations in the inferior leads. Successful LORENA PCI of the RCA.     Today, he feels well. No chest pain, no shortness of breath. No palpitations. Hemodynamically stable.       PAST MEDICAL & SURGICAL HISTORY:  Stage 1 chronic kidney disease  H/O type 1 diabetes mellitus  HLD (hyperlipidemia)  HTN (hypertension)  H/O knee surgery  CURRENT CARDIAC WORKUP:       < from: Cardiac Catheterization (11.05.22 @ 12:27) >   Diagnostic Conclusions   One Vessel coronary artery disease (RCA) .   Normal left ventricularsystolic function with segmental wall motion   abnormalities. Estimated LV ejection fraction is 55 %.   Elevated left ventricular end-diastolic pressure 22 mm Hg.   No aortic valve stenosis.     Interventional Conclusions   Successful Coronary Intervention LORENA of proximal RCA.   Successful Coronary Intervention LORENA of mid RCA. Slow flow noted from   distal edge dissection, another distal stent placed.    < from: TTE Echo Complete w/o Contrast w/ Doppler (11.05.22 @ 15:22) >   The aortic valve is well visualized, appears mildly calcified. Valve opening seems to be normal.   Mild (1+) aortic regurgitation is present.   Normal appearing left atrium.   The left ventricle is normal in size, wall thickness,and contractility as seen in limited views. Estimated left ventricular ejection fraction is 55 %.   There appears to be hypokinesis of the mid /basal inferoseptal wall and mid/ basal inferior wall.   The IVC appears normal.   Mild dilatation of the ascending aortic segment measuring 4.0cm.   The mitral valve was well visualized.   The mitral valve leaflets appear thin and normal.   Mild mitral annular calcification is present.   Mild (1+) mitral regurgitation is present.   EA reversal of the mitral inflow consistent with reduced compliance of the left ventricle.   Trace pericardial effusion is present.   No evidence of pleural effusion.   Normal appearing pulmonic valve structure and function.   Normal appearing right atrium.   Normal appearing right ventricle structure and function.   The tricuspid valve leaflets are thin and pliable; valve motion is normal.   Mild (1+) tricuspid valve regurgitation is present.   No evidence pulmonary hypertension.      Allergies:   No Known Allergies      MEDICATIONS  (STANDING):  aspirin  chewable 81 milliGRAM(s) Oral daily  atorvastatin 80 milliGRAM(s) Oral at bedtime  clopidogrel Tablet 75 milliGRAM(s) Oral daily  dextrose 5%. 1000 milliLiter(s) (50 mL/Hr) IV Continuous <Continuous>  dextrose 5%. 1000 milliLiter(s) (100 mL/Hr) IV Continuous <Continuous>  dextrose 50% Injectable 25 Gram(s) IV Push once  dextrose 50% Injectable 12.5 Gram(s) IV Push once  dextrose 50% Injectable 25 Gram(s) IV Push once  glucagon  Injectable 1 milliGRAM(s) IntraMuscular once  insulin lispro (ADMELOG) corrective regimen sliding scale   SubCutaneous Before meals and at bedtime  metoprolol tartrate 12.5 milliGRAM(s) Oral two times a day  pantoprazole    Tablet 40 milliGRAM(s) Oral before breakfast  sodium chloride 0.9%. 1000 milliLiter(s) (100 mL/Hr) IV Continuous <Continuous>    MEDICATIONS  (PRN):  dextrose Oral Gel 15 Gram(s) Oral once PRN Blood Glucose LESS THAN 70 milliGRAM(s)/deciliter  melatonin 5 milliGRAM(s) Oral at bedtime PRN Insomnia        Vital Signs Last 24 Hrs  T(C): 36.7 (06 Nov 2022 10:17), Max: 37.2 (06 Nov 2022 05:00)  T(F): 98.1 (06 Nov 2022 10:17), Max: 98.9 (06 Nov 2022 05:00)  HR: 77 (06 Nov 2022 23:00) (71 - 98)  BP: 96/44 (06 Nov 2022 23:00) (85/56 - 122/63)  BP(mean): 57 (06 Nov 2022 23:00) (51 - 95)  SpO2: 95% (06 Nov 2022 02:00) (94% - 96%)    Parameters below as of 06 Nov 2022 02:00  Patient On (Oxygen Delivery Method): room air        PHYSICAL EXAM:    General:                Comfortable, AAO X 3, in no distress.   HEENT:                  Atraumatic, PERRLA, EOMI, conjunctiva clear.   Neck:                     Supple, no adenopathy, no thyromegaly, no JVD, no bruit.  Chest:                    Clear, B/L symmetric air entry, no tachypnea  Heart:                     S1, S2 normal, no gallop, no murmur.  Abdomen:              Soft, non-tender, bowel sounds active. No palpable masses.  Extremities:           no cyanosis, no edema. Peripheral pulses normal.  Skin:                      Skin color, texture normal. No rashes.  Neurologic:            Grossly nonfocal.       TELEMETRY:   Normal sinus rhythm with no tachy or osmin events     ECG:   Sinus. Inferior Q waves, ST elevation    LABS:                        10.9   7.74  )-----------( 167      ( 06 Nov 2022 05:39 )             32.9     11-06    138  |  107  |  25<H>  ----------------------------<  224<H>  4.0   |  27  |  1.47<H>    Ca    8.3<L>      06 Nov 2022 05:39  Mg     2.2     11-05    TPro  8.6<H>  /  Alb  4.0  /  TBili  0.5  /  DBili  x   /  AST  121<H>  /  ALT  40  /  AlkPhos  73  11-05      Lipid Panel  Chl 127  HDL 64  LDL 52  Trg 56        Pro BNP  3081 11-05 @ 11:47      PT/INR - ( 05 Nov 2022 11:47 )   PT: 13.1 sec;   INR: 1.13 ratio    PTT - ( 05 Nov 2022 11:47 )  PTT:32.4 sec      RADIOLOGY & ADDITIONAL STUDIES:  < from: Xray Chest 1 View-PORTABLE IMMEDIATE (11.05.22 @ 14:13) >  Impression:    No acute pulmonary disease.

## 2022-11-06 NOTE — PROGRESS NOTE ADULT - SUBJECTIVE AND OBJECTIVE BOX
Patient is a 72y old  Male who presents with a chief complaint of STEMI (05 Nov 2022 15:38)    PAST MEDICAL & SURGICAL HISTORY:  Stage 1 chronic kidney disease      H/O type 1 diabetes mellitus      HLD (hyperlipidemia)      HTN (hypertension)      H/O knee surgery        KYLEE FARAH   72y    Male    BRIEF HOSPITAL COURSE:    Review of Systems:                       All other ROS are negative.    Allergies    No Known Allergies    Intolerances      Weight (kg): 86.9 (11-05-22 @ 11:50)  BMI (kg/m2): 26 (11-05-22 @ 11:50)    ICU Vital Signs Last 24 Hrs  T(C): 37.2 (05 Nov 2022 20:00), Max: 37.2 (05 Nov 2022 20:00)  T(F): 98.9 (05 Nov 2022 20:00), Max: 98.9 (05 Nov 2022 20:00)  HR: 84 (05 Nov 2022 20:00) (73 - 84)  BP: 92/47 (05 Nov 2022 20:00) (92/47 - 149/81)  BP(mean): 58 (05 Nov 2022 20:00) (58 - 101)  ABP: --  ABP(mean): --  RR: 11 (05 Nov 2022 13:50) (11 - 16)  SpO2: 98% (05 Nov 2022 18:00) (83% - 100%)    O2 Parameters below as of 05 Nov 2022 11:34  Patient On (Oxygen Delivery Method): room air          Physical Examination:    General: NAD    HEENT: No JVD    PULM: bilateral     CVS: s1 s2 reg    ABD: bilateral BS     EXT: no edema     SKIN: warm    Neuro: Alert NF          LABS:                        13.5   10.04 )-----------( 249      ( 05 Nov 2022 11:47 )             41.6     11-05    135  |  103  |  30<H>  ----------------------------<  270<H>  4.0   |  25  |  1.71<H>    Ca    9.3      05 Nov 2022 11:47  Mg     2.2     11-05    TPro  8.6<H>  /  Alb  4.0  /  TBili  0.5  /  DBili  x   /  AST  121<H>  /  ALT  40  /  AlkPhos  73  11-05          CAPILLARY BLOOD GLUCOSE      POCT Blood Glucose.: 211 mg/dL (05 Nov 2022 21:36)  POCT Blood Glucose.: 290 mg/dL (05 Nov 2022 18:44)  POCT Blood Glucose.: 249 mg/dL (05 Nov 2022 14:22)    PT/INR - ( 05 Nov 2022 11:47 )   PT: 13.1 sec;   INR: 1.13 ratio         PTT - ( 05 Nov 2022 11:47 )  PTT:32.4 sec    CULTURES:      Medications:  MEDICATIONS  (STANDING):  aspirin  chewable 81 milliGRAM(s) Oral daily  atorvastatin 80 milliGRAM(s) Oral at bedtime  clopidogrel Tablet 75 milliGRAM(s) Oral daily  dextrose 5%. 1000 milliLiter(s) (100 mL/Hr) IV Continuous <Continuous>  dextrose 5%. 1000 milliLiter(s) (50 mL/Hr) IV Continuous <Continuous>  dextrose 50% Injectable 25 Gram(s) IV Push once  dextrose 50% Injectable 12.5 Gram(s) IV Push once  dextrose 50% Injectable 25 Gram(s) IV Push once  glucagon  Injectable 1 milliGRAM(s) IntraMuscular once  insulin lispro (ADMELOG) corrective regimen sliding scale   SubCutaneous at bedtime  insulin lispro (ADMELOG) corrective regimen sliding scale   SubCutaneous three times a day before meals  metoprolol tartrate 50 milliGRAM(s) Oral two times a day  sodium chloride 0.9%. 1000 milliLiter(s) (100 mL/Hr) IV Continuous <Continuous>    MEDICATIONS  (PRN):  dextrose Oral Gel 15 Gram(s) Oral once PRN Blood Glucose LESS THAN 70 milliGRAM(s)/deciliter  melatonin 5 milliGRAM(s) Oral at bedtime PRN Insomnia        11-05 @ 08:01  -  11-06 @ 01:40  --------------------------------------------------------  IN: 1120 mL / OUT: 650 mL / NET: 470 mL        RADIOLOGY/IMAGING/ECHO      < from: Cardiac Catheterization (11.05.22 @ 12:27) >     Diagnostic Conclusions   One Vessel coronary artery disease (RCA) .   Normal left ventricularsystolic function with segmental wall motion   abnormalities. Estimated LV ejection fraction is 55 %.   Elevated left ventricular end-diastolic pressure 22 mm Hg.   No aortic valve stenosis.     Interventional Conclusions     Successful Coronary Intervention LORENA of proximal RCA.   Successful Coronary Intervention LORENA of mid RCA. Slow flow noted from   distal edge dissection, another distal stent placed.     Recommendations     Percutaneous coronary intervention of RCA today - infarct related artery.     Aggressive medical management of coronary artery disease and its   underlying risk factors.     Aspirin 81 mg PO daily .   Add clopidogrel (Plavix) 75 mg PO daily.    Estimated Blood Loss:4ml.    Complications:  No complication.      < end of copied text >    Assessment/Plan:    72M DM (1)  HLD CKD stage 1 admit 11/5 with CP x 3 days.     Inferior STEMAI > 3 LORENA to the RCA.    No CP or arrythmia   DAPT/Statin BB      Takes Cozaar at home > will hold for now as BP is low.    He is on ISS.  Takes a sliding scale Lantus      He is adamant about alternating doses of Lantus based on his evening glucose,  He will ask for then need an order nightly for the dose that he wants.        Can go to tele later today if no arrythmia.             Patient is a 72y old  Male who presents with a chief complaint of STEMI (05 Nov 2022 15:38)    PAST MEDICAL & SURGICAL HISTORY:  Stage 1 chronic kidney disease      H/O type 1 diabetes mellitus      HLD (hyperlipidemia)      HTN (hypertension)      H/O knee surgery        KYLEE FARAH   72y    Male    BRIEF HOSPITAL COURSE:    Review of Systems:    No CP or palps                    All other ROS are negative.    Allergies    No Known Allergies    Intolerances      Weight (kg): 86.9 (11-05-22 @ 11:50)  BMI (kg/m2): 26 (11-05-22 @ 11:50)    ICU Vital Signs Last 24 Hrs  T(C): 37.2 (05 Nov 2022 20:00), Max: 37.2 (05 Nov 2022 20:00)  T(F): 98.9 (05 Nov 2022 20:00), Max: 98.9 (05 Nov 2022 20:00)  HR: 84 (05 Nov 2022 20:00) (73 - 84)  BP: 92/47 (05 Nov 2022 20:00) (92/47 - 149/81)  BP(mean): 58 (05 Nov 2022 20:00) (58 - 101)  ABP: --  ABP(mean): --  RR: 11 (05 Nov 2022 13:50) (11 - 16)  SpO2: 98% (05 Nov 2022 18:00) (83% - 100%)    O2 Parameters below as of 05 Nov 2022 11:34  Patient On (Oxygen Delivery Method): room air          Physical Examination:    General: NAD    HEENT: No JVD    PULM: bilateral     CVS: s1 s2 reg    ABD: bilateral BS     EXT: no edema     SKIN: warm    Neuro: Alert NF          LABS:                        13.5   10.04 )-----------( 249      ( 05 Nov 2022 11:47 )             41.6     11-05    135  |  103  |  30<H>  ----------------------------<  270<H>  4.0   |  25  |  1.71<H>    Ca    9.3      05 Nov 2022 11:47  Mg     2.2     11-05    TPro  8.6<H>  /  Alb  4.0  /  TBili  0.5  /  DBili  x   /  AST  121<H>  /  ALT  40  /  AlkPhos  73  11-05          CAPILLARY BLOOD GLUCOSE      POCT Blood Glucose.: 211 mg/dL (05 Nov 2022 21:36)  POCT Blood Glucose.: 290 mg/dL (05 Nov 2022 18:44)  POCT Blood Glucose.: 249 mg/dL (05 Nov 2022 14:22)    PT/INR - ( 05 Nov 2022 11:47 )   PT: 13.1 sec;   INR: 1.13 ratio         PTT - ( 05 Nov 2022 11:47 )  PTT:32.4 sec    CULTURES:      Medications:  MEDICATIONS  (STANDING):  aspirin  chewable 81 milliGRAM(s) Oral daily  atorvastatin 80 milliGRAM(s) Oral at bedtime  clopidogrel Tablet 75 milliGRAM(s) Oral daily  dextrose 5%. 1000 milliLiter(s) (100 mL/Hr) IV Continuous <Continuous>  dextrose 5%. 1000 milliLiter(s) (50 mL/Hr) IV Continuous <Continuous>  dextrose 50% Injectable 25 Gram(s) IV Push once  dextrose 50% Injectable 12.5 Gram(s) IV Push once  dextrose 50% Injectable 25 Gram(s) IV Push once  glucagon  Injectable 1 milliGRAM(s) IntraMuscular once  insulin lispro (ADMELOG) corrective regimen sliding scale   SubCutaneous at bedtime  insulin lispro (ADMELOG) corrective regimen sliding scale   SubCutaneous three times a day before meals  metoprolol tartrate 50 milliGRAM(s) Oral two times a day  sodium chloride 0.9%. 1000 milliLiter(s) (100 mL/Hr) IV Continuous <Continuous>    MEDICATIONS  (PRN):  dextrose Oral Gel 15 Gram(s) Oral once PRN Blood Glucose LESS THAN 70 milliGRAM(s)/deciliter  melatonin 5 milliGRAM(s) Oral at bedtime PRN Insomnia        11-05 @ 08:01  -  11-06 @ 01:40  --------------------------------------------------------  IN: 1120 mL / OUT: 650 mL / NET: 470 mL        RADIOLOGY/IMAGING/ECHO      < from: Cardiac Catheterization (11.05.22 @ 12:27) >     Diagnostic Conclusions   One Vessel coronary artery disease (RCA) .   Normal left ventricularsystolic function with segmental wall motion   abnormalities. Estimated LV ejection fraction is 55 %.   Elevated left ventricular end-diastolic pressure 22 mm Hg.   No aortic valve stenosis.     Interventional Conclusions     Successful Coronary Intervention LORENA of proximal RCA.   Successful Coronary Intervention LORENA of mid RCA. Slow flow noted from   distal edge dissection, another distal stent placed.     Recommendations     Percutaneous coronary intervention of RCA today - infarct related artery.     Aggressive medical management of coronary artery disease and its   underlying risk factors.     Aspirin 81 mg PO daily .   Add clopidogrel (Plavix) 75 mg PO daily.    Estimated Blood Loss:4ml.    Complications:  No complication.      < end of copied text >    Assessment/Plan:    72M DM (1)  HLD CKD stage 1 admit 11/5 with CP x 3 days.     Inferior STEMI > 3 LORENA to the RCA.    No CP or arrythmia   DAPT/Statin BB      Takes Cozaar at home > will hold for now as BP is low.    He is on ISS.  Takes a sliding scale Lantus      He is adamant about alternating doses of Lantus based on his evening glucose,  He will ask for then need an order nightly for the dose that he wants.        Can go to tele later today if no arrythmia.

## 2022-11-06 NOTE — PROGRESS NOTE ADULT - ASSESSMENT
Inferior STEMI  DM  HLD    Suggest:    Continue aspirin, Plavix  Low dose beta blockers  Resume Losartan  Statins  Possible discharge home in AM  Cardiac rehab  D/W pt's wife at bedside.

## 2022-11-07 ENCOUNTER — TRANSCRIPTION ENCOUNTER (OUTPATIENT)
Age: 72
End: 2022-11-07

## 2022-11-07 VITALS — HEART RATE: 74 BPM | SYSTOLIC BLOOD PRESSURE: 101 MMHG | DIASTOLIC BLOOD PRESSURE: 53 MMHG

## 2022-11-07 LAB
ANION GAP SERPL CALC-SCNC: 4 MMOL/L — LOW (ref 5–17)
BASOPHILS # BLD AUTO: 0.03 K/UL — SIGNIFICANT CHANGE UP (ref 0–0.2)
BASOPHILS NFR BLD AUTO: 0.4 % — SIGNIFICANT CHANGE UP (ref 0–2)
BUN SERPL-MCNC: 25 MG/DL — HIGH (ref 7–23)
CALCIUM SERPL-MCNC: 8.6 MG/DL — SIGNIFICANT CHANGE UP (ref 8.5–10.1)
CHLORIDE SERPL-SCNC: 105 MMOL/L — SIGNIFICANT CHANGE UP (ref 96–108)
CK SERPL-CCNC: 315 U/L — HIGH (ref 26–308)
CO2 SERPL-SCNC: 26 MMOL/L — SIGNIFICANT CHANGE UP (ref 22–31)
CREAT SERPL-MCNC: 1.44 MG/DL — HIGH (ref 0.5–1.3)
EGFR: 52 ML/MIN/1.73M2 — LOW
EOSINOPHIL # BLD AUTO: 0.36 K/UL — SIGNIFICANT CHANGE UP (ref 0–0.5)
EOSINOPHIL NFR BLD AUTO: 4.6 % — SIGNIFICANT CHANGE UP (ref 0–6)
GLUCOSE SERPL-MCNC: 180 MG/DL — HIGH (ref 70–99)
HCT VFR BLD CALC: 35.2 % — LOW (ref 39–50)
HGB BLD-MCNC: 11.6 G/DL — LOW (ref 13–17)
IMM GRANULOCYTES NFR BLD AUTO: 0.4 % — SIGNIFICANT CHANGE UP (ref 0–0.9)
LYMPHOCYTES # BLD AUTO: 1.08 K/UL — SIGNIFICANT CHANGE UP (ref 1–3.3)
LYMPHOCYTES # BLD AUTO: 13.8 % — SIGNIFICANT CHANGE UP (ref 13–44)
MAGNESIUM SERPL-MCNC: 1.9 MG/DL — SIGNIFICANT CHANGE UP (ref 1.6–2.6)
MCHC RBC-ENTMCNC: 30.8 PG — SIGNIFICANT CHANGE UP (ref 27–34)
MCHC RBC-ENTMCNC: 33 GM/DL — SIGNIFICANT CHANGE UP (ref 32–36)
MCV RBC AUTO: 93.4 FL — SIGNIFICANT CHANGE UP (ref 80–100)
MONOCYTES # BLD AUTO: 0.94 K/UL — HIGH (ref 0–0.9)
MONOCYTES NFR BLD AUTO: 12 % — SIGNIFICANT CHANGE UP (ref 2–14)
NEUTROPHILS # BLD AUTO: 5.41 K/UL — SIGNIFICANT CHANGE UP (ref 1.8–7.4)
NEUTROPHILS NFR BLD AUTO: 68.8 % — SIGNIFICANT CHANGE UP (ref 43–77)
PHOSPHATE SERPL-MCNC: 2.7 MG/DL — SIGNIFICANT CHANGE UP (ref 2.5–4.5)
PLATELET # BLD AUTO: 192 K/UL — SIGNIFICANT CHANGE UP (ref 150–400)
POTASSIUM SERPL-MCNC: 4.1 MMOL/L — SIGNIFICANT CHANGE UP (ref 3.5–5.3)
POTASSIUM SERPL-SCNC: 4.1 MMOL/L — SIGNIFICANT CHANGE UP (ref 3.5–5.3)
RBC # BLD: 3.77 M/UL — LOW (ref 4.2–5.8)
RBC # FLD: 12.6 % — SIGNIFICANT CHANGE UP (ref 10.3–14.5)
SODIUM SERPL-SCNC: 135 MMOL/L — SIGNIFICANT CHANGE UP (ref 135–145)
TROPONIN I, HIGH SENSITIVITY RESULT: HIGH NG/L
WBC # BLD: 7.85 K/UL — SIGNIFICANT CHANGE UP (ref 3.8–10.5)
WBC # FLD AUTO: 7.85 K/UL — SIGNIFICANT CHANGE UP (ref 3.8–10.5)

## 2022-11-07 PROCEDURE — 99222 1ST HOSP IP/OBS MODERATE 55: CPT

## 2022-11-07 PROCEDURE — 93010 ELECTROCARDIOGRAM REPORT: CPT

## 2022-11-07 RX ORDER — ASPIRIN/CALCIUM CARB/MAGNESIUM 324 MG
1 TABLET ORAL
Qty: 90 | Refills: 0
Start: 2022-11-07 | End: 2023-02-04

## 2022-11-07 RX ORDER — TADALAFIL 10 MG/1
1 TABLET, FILM COATED ORAL
Qty: 0 | Refills: 0 | DISCHARGE

## 2022-11-07 RX ORDER — HYDROXYCHLOROQUINE SULFATE 200 MG
200 TABLET ORAL DAILY
Refills: 0 | Status: DISCONTINUED | OUTPATIENT
Start: 2022-11-07 | End: 2022-11-07

## 2022-11-07 RX ORDER — ATORVASTATIN CALCIUM 80 MG/1
1 TABLET, FILM COATED ORAL
Qty: 14 | Refills: 0
Start: 2022-11-07 | End: 2022-11-20

## 2022-11-07 RX ORDER — METOPROLOL TARTRATE 50 MG
0.5 TABLET ORAL
Qty: 14 | Refills: 0
Start: 2022-11-07 | End: 2022-11-20

## 2022-11-07 RX ORDER — ASPIRIN/ACETAMINOPHEN/CAFFEINE 250-250-65
30 TABLET ORAL
Qty: 0 | Refills: 0 | DISCHARGE

## 2022-11-07 RX ORDER — ATORVASTATIN CALCIUM 80 MG/1
1 TABLET, FILM COATED ORAL
Qty: 0 | Refills: 0 | DISCHARGE

## 2022-11-07 RX ORDER — CLOPIDOGREL BISULFATE 75 MG/1
1 TABLET, FILM COATED ORAL
Qty: 30 | Refills: 0
Start: 2022-11-07 | End: 2022-12-06

## 2022-11-07 RX ORDER — COLCHICINE 0.6 MG
0.6 TABLET ORAL
Refills: 0 | Status: DISCONTINUED | OUTPATIENT
Start: 2022-11-07 | End: 2022-11-07

## 2022-11-07 RX ORDER — COLCHICINE 0.6 MG
1 TABLET ORAL
Qty: 28 | Refills: 0
Start: 2022-11-07 | End: 2022-11-20

## 2022-11-07 RX ORDER — LOSARTAN POTASSIUM 100 MG/1
25 TABLET, FILM COATED ORAL DAILY
Refills: 0 | Status: DISCONTINUED | OUTPATIENT
Start: 2022-11-07 | End: 2022-11-07

## 2022-11-07 RX ADMIN — Medication 12.5 MILLIGRAM(S): at 08:32

## 2022-11-07 RX ADMIN — Medication 81 MILLIGRAM(S): at 08:32

## 2022-11-07 RX ADMIN — Medication 4: at 11:34

## 2022-11-07 RX ADMIN — CLOPIDOGREL BISULFATE 75 MILLIGRAM(S): 75 TABLET, FILM COATED ORAL at 08:32

## 2022-11-07 RX ADMIN — Medication 0.6 MILLIGRAM(S): at 11:39

## 2022-11-07 RX ADMIN — PANTOPRAZOLE SODIUM 40 MILLIGRAM(S): 20 TABLET, DELAYED RELEASE ORAL at 08:20

## 2022-11-07 RX ADMIN — LOSARTAN POTASSIUM 25 MILLIGRAM(S): 100 TABLET, FILM COATED ORAL at 11:34

## 2022-11-07 NOTE — DISCHARGE NOTE PROVIDER - HOSPITAL COURSE
The patient is a 72 Y.O. Male with pmh of HTN, T2DM insulin dependent, RA on hydroxychloroquine who presents to the ED for STEMI. Patient was found to have a RCA obstruction with inferior wall MI s/p PCI x3    Patient was having chest pain, but had angina like symptoms for several months. Had EGD and work up for GERD/Gastritis, on PPI. Was having worse substernal chest pain with mild LUZ for 2-3 days. Presented for now 8/10 chest pain with no radiation. ROS was otherwise negative. EKG with STEMI in the inferior wall and had a urgent cath on 11/5 s/p stents x3. Since patient has been chest pain free. Had 1 episode of chest pain on 11/6 overnight, no ekg changes thought to be 2/2 to pericarditis started on Colchicine. Patient had a TTE showing  hypokinesis of the mid /basal inferoseptal wall and mid/ basal inferior wall. Trops peaked at 69K and trended down. Patient    The patient is a 72 Y.O. Male with pmh of HTN, T2DM insulin dependent, RA on hydroxychloroquine who presents to the ED for STEMI. Patient was found to have a RCA obstruction with inferior wall MI s/p PCI x3    Patient was having chest pain, but had angina like symptoms for several months. Had EGD and work up for GERD/Gastritis, on PPI. Was having worse substernal chest pain with mild LUZ for 2-3 days. Presented for now 8/10 chest pain with no radiation. ROS was otherwise negative. EKG with STEMI in the inferior wall and had a urgent cath on 11/5 s/p stents x3. Since patient has been chest pain free. Had 1 episode of chest pain on 11/6 overnight, no ekg changes thought to be 2/2 to pericarditis started on Colchicine. Patient had a TTE showing  hypokinesis of the mid /basal inferoseptal wall and mid/ basal inferior wall. Trops peaked at 69K and trended down. Patient  was chest pain free, doing well The patient is a 72 Y.O. Male with pmh of HTN, T2DM insulin dependent, RA on hydroxychloroquine who presents to the ED for STEMI. Patient was found to have a RCA obstruction with inferior wall MI s/p PCI x3    Patient was having chest pain, but had angina like symptoms for several months. Had EGD and work up for GERD/Gastritis, on PPI. Was having worse substernal chest pain with mild LUZ for 2-3 days. Presented for now 8/10 chest pain with no radiation. ROS was otherwise negative. EKG with STEMI in the inferior wall and had a urgent cath on 11/5 s/p stents x3. Since patient has been chest pain free. Had 1 episode of chest pain on 11/6 overnight, no ekg changes thought to be 2/2 to pericarditis started on Colchicine. Patient had a TTE showing  hypokinesis of the mid /basal inferoseptal wall and mid/ basal inferior wall. Trops peaked at 69K and trended down. Patient  was chest pain free, doing well on discharge. The patient is a 72 Y.O. Male with pmh of HTN, T2DM insulin dependent, RA on hydroxychloroquine who presents to the ED for STEMI. Patient was found to have a RCA obstruction with inferior wall MI s/p PCI x3    Patient was having chest pain, but had angina like symptoms for several months. Had EGD and work up for GERD/Gastritis, on PPI. Was having worse substernal chest pain with mild LUZ for 2-3 days. Presented for now 8/10 chest pain with no radiation. ROS was otherwise negative. EKG with STEMI in the inferior wall and had a urgent cath on 11/5 s/p stents x3. Since patient has been chest pain free. Had 1 episode of chest pain on 11/6 overnight, no ekg changes thought to be 2/2 to pericarditis started on Colchicine. Patient had a TTE showing  hypokinesis of the mid /basal inferoseptal wall and mid/ basal inferior wall. Trops peaked at 69K and trended down. Patient  was chest pain free, doing well on discharge.    Patient was previously taking Cialis prior to admission. Was advised not to take it until cleared by his cardiologists.

## 2022-11-07 NOTE — H&P ADULT - NSHPREVIEWOFSYSTEMS_GEN_ALL_CORE
REVIEW OF SYSTEMS  General: no sweating; fever; chills; anorexia; weight loss: malaise  Skin/Breast: no rash; no itching; no dryness	  Ophthalmologic: no diplopia; no photophobia; No eye pain, no visual changes	  ENMT: no hearing difficulty;  no sinus symptoms: no throat pain  oral pain with lesions.  Respiratory and Thorax: no wheezing; no dyspnea; no cough; no hemoptysis, no sputum production, no stridor	  Cardiovascular: no chest pain; no palpitations; no dyspnea on exertion; no orthopnea, no pedal edema  Gastrointestinal: no nausea; no vomiting; no melena; no hematochezia; no jaundice; no diarrhea, no abminal pain.   Genitourinary: no hematuria; no renal colic; no flank pain L; no flank pain R; no urine discoloration; no dysuria  Musculoskeletal: no arthralgia; no arthritis; no joint swelling; no myalgia  Neurological: no weakness; no headache; no loss of consciousness; no confusion  Psychiatric: no suicidal ideation; no depression; no anxiety; no insomnia  Hematology/Lymphatics: no gum bleeding; no nose bleeding; no skin lumps  Endocrine: No heat/cold intolerance, no polydipsia, or polyuria.

## 2022-11-07 NOTE — DISCHARGE NOTE PROVIDER - CARE PROVIDER_API CALL
Harshal Santos)  Cardiology; Interventional Cardiology  [address] [facility] [place], [place], [place] [address]  Phone: [phone]  Fax: [phone]  Follow Up Time:

## 2022-11-07 NOTE — DISCHARGE NOTE PROVIDER - NSDCCPCAREPLAN_GEN_ALL_CORE_FT
PRINCIPAL DISCHARGE DIAGNOSIS  Diagnosis: ST elevation MI (STEMI)  Assessment and Plan of Treatment:

## 2022-11-07 NOTE — DISCHARGE NOTE NURSING/CASE MANAGEMENT/SOCIAL WORK - PATIENT PORTAL LINK FT
You can access the FollowMyHealth Patient Portal offered by [facility] by registering at the following website: [email or web address]. By joining [facility]’s FollowMyHealth portal, you will also be able to view your health information using other applications (apps) compatible with our system.

## 2022-11-07 NOTE — H&P ADULT - NSHPSOCIALHISTORY_GEN_ALL_CORE
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

## 2022-11-07 NOTE — PROGRESS NOTE ADULT - ASSESSMENT
Inferior STEMI  DM  HLD    Suggest:    Continue aspirin, Plavix  Low dose beta blockers  Resume Losartan  Follow up renal function. Continue losartan if renal function is stable.  Statins  Possible discharge home in AM  Cardiac rehab   Inferior STEMI  CAD, s/p LORENA PCI of RCA.  Post infarct pericarditis.   DM  HLD    Suggest:    Continue aspirin, Plavix  Low dose beta blockers  Resume Losartan  Follow up renal function. Continue losartan if renal function is stable.  Statins  Colchicine 0.6 mg BID X 2 months  PPIs  Possible discharge home today  Cardiac rehab

## 2022-11-07 NOTE — DISCHARGE NOTE NURSING/CASE MANAGEMENT/SOCIAL WORK - NSDCPEFALRISK_GEN_ALL_CORE
For information on Fall & Injury Prevention, visit: [email or web address] OR  [email or web address] OR  https://www.cdc.gov/steadi/patient.html

## 2022-11-07 NOTE — H&P ADULT - HISTORY OF PRESENT ILLNESS
The patient is a 72 Y.O. Male with pmh of HTN, T2DM insulin dependent, RA on hydroxychloroquine who presents to the ED for STEMI. Patient was found to have a RCA obstruction with inferior wall MI s/p PCI x3    Patient was having chest pain, but had angina like symptoms for several months. Had EGD and work up for GERD/Gastritis, on PPI. Was having worse substernal chest pain with mild LUZ for 2-3 days. Presented for now 8/10 chest pain with no radiation. ROS was otherwise negative. EKG with STEMI in the inferior wall and had a urgent cath on 11/5 s/p stents x3. Since patient has been chest pain free. Had 1 episode of chest pain on 11/6 overnight, no ekg changes thought to be 2/2 to pericarditis started on Colchicine.

## 2022-11-07 NOTE — H&P ADULT - NSHPLABSRESULTS_GEN_ALL_CORE
11.6   7.85  )-----------( 192      ( 07 Nov 2022 09:30 )             35.2       11-07    135  |  105  |  25<H>  ----------------------------<  180<H>  4.1   |  26  |  1.44<H>    Ca    8.6      07 Nov 2022 09:30  Phos  2.7     11-07  Mg     1.9     11-07    TPro  8.6<H>  /  Alb  4.0  /  TBili  0.5  /  DBili  x   /  AST  121<H>  /  ALT  40  /  AlkPhos  73  11-05                  PT/INR - ( 05 Nov 2022 11:47 )   PT: 13.1 sec;   INR: 1.13 ratio         PTT - ( 05 Nov 2022 11:47 )  PTT:32.4 sec    Lactate Trend            CAPILLARY BLOOD GLUCOSE      POCT Blood Glucose.: 115 mg/dL (07 Nov 2022 07:42)            RADIOLOGY, EKG & ADDITIONAL TESTS: Reviewed.

## 2022-11-07 NOTE — PROGRESS NOTE ADULT - SUBJECTIVE AND OBJECTIVE BOX
72y old  Male who presents with a chief complaint of chest pain. Off and on for the last 2-3 days but worse last night and today AM. Came to ER and EKG showed ST elevations in the inferior leads. Successful LORENA PCI of the RCA.     Today, overall doing well. No new events. Hemodynamically stable.       PAST MEDICAL & SURGICAL HISTORY:  Stage 1 chronic kidney disease  H/O type 1 diabetes mellitus  HLD (hyperlipidemia)  HTN (hypertension)  H/O knee surgery  CURRENT CARDIAC WORKUP:       < from: Cardiac Catheterization (11.05.22 @ 12:27) >   Diagnostic Conclusions   One Vessel coronary artery disease (RCA) .   Normal left ventricularsystolic function with segmental wall motion   abnormalities. Estimated LV ejection fraction is 55 %.   Elevated left ventricular end-diastolic pressure 22 mm Hg.   No aortic valve stenosis.     Interventional Conclusions   Successful Coronary Intervention LORENA of proximal RCA.   Successful Coronary Intervention LORENA of mid RCA. Slow flow noted from   distal edge dissection, another distal stent placed.    < from: TTE Echo Complete w/o Contrast w/ Doppler (11.05.22 @ 15:22) >   The aortic valve is well visualized, appears mildly calcified. Valve opening seems to be normal.   Mild (1+) aortic regurgitation is present.   Normal appearing left atrium.   The left ventricle is normal in size, wall thickness,and contractility as seen in limited views. Estimated left ventricular ejection fraction is 55 %.   There appears to be hypokinesis of the mid /basal inferoseptal wall and mid/ basal inferior wall.   The IVC appears normal.   Mild dilatation of the ascending aortic segment measuring 4.0cm.   The mitral valve was well visualized.   The mitral valve leaflets appear thin and normal.   Mild mitral annular calcification is present.   Mild (1+) mitral regurgitation is present.   EA reversal of the mitral inflow consistent with reduced compliance of the left ventricle.   Trace pericardial effusion is present.   No evidence of pleural effusion.   Normal appearing pulmonic valve structure and function.   Normal appearing right atrium.   Normal appearing right ventricle structure and function.   The tricuspid valve leaflets are thin and pliable; valve motion is normal.   Mild (1+) tricuspid valve regurgitation is present.   No evidence pulmonary hypertension.      Allergies:   No Known Allergies      MEDICATIONS  (STANDING):  aspirin  chewable 81 milliGRAM(s) Oral daily  atorvastatin 80 milliGRAM(s) Oral at bedtime  clopidogrel Tablet 75 milliGRAM(s) Oral daily  dextrose 5%. 1000 milliLiter(s) (50 mL/Hr) IV Continuous <Continuous>  dextrose 5%. 1000 milliLiter(s) (100 mL/Hr) IV Continuous <Continuous>  dextrose 50% Injectable 25 Gram(s) IV Push once  dextrose 50% Injectable 12.5 Gram(s) IV Push once  dextrose 50% Injectable 25 Gram(s) IV Push once  glucagon  Injectable 1 milliGRAM(s) IntraMuscular once  insulin lispro (ADMELOG) corrective regimen sliding scale   SubCutaneous Before meals and at bedtime  losartan 25 milliGRAM(s) Oral daily  metoprolol tartrate 12.5 milliGRAM(s) Oral two times a day  pantoprazole    Tablet 40 milliGRAM(s) Oral before breakfast    MEDICATIONS  (PRN):  dextrose Oral Gel 15 Gram(s) Oral once PRN Blood Glucose LESS THAN 70 milliGRAM(s)/deciliter  melatonin 5 milliGRAM(s) Oral at bedtime PRN Insomnia        Vital Signs Last 24 Hrs  T(C): 37.1 (07 Nov 2022 08:00), Max: 37.1 (07 Nov 2022 08:00)  T(F): 98.7 (07 Nov 2022 08:00), Max: 98.7 (07 Nov 2022 08:00)  HR: 79 (07 Nov 2022 08:00) (72 - 98)  BP: 110/54 (07 Nov 2022 08:00) (85/56 - 122/63)  BP(mean): 68 (07 Nov 2022 08:00) (51 - 95)  RR: 16 (07 Nov 2022 06:00) (12 - 16)  SpO2: 97% (07 Nov 2022 06:00) (97% - 98%)    Parameters below as of 07 Nov 2022 06:00  Patient On (Oxygen Delivery Method): room air        PHYSICAL EXAM:    General:                Comfortable, AAO X 3, in no distress.   HEENT:                  Atraumatic, PERRLA, EOMI, conjunctiva clear.   Neck:                     Supple, no adenopathy, no thyromegaly, no JVD, no bruit.  Chest:                    Clear, B/L symmetric air entry, no tachypnea  Heart:                     S1, S2 normal, no gallop, no murmur.  Abdomen:              Soft, non-tender, bowel sounds active. No palpable masses.  Extremities:           no cyanosis, no edema. Peripheral pulses normal.  Skin:                      Skin color, texture normal. No rashes.  Neurologic:            Grossly nonfocal.       TELEMETRY:   Normal sinus rhythm with no tachy or osmin events     ECG:   Sinus. Inferior Q waves, ST elevation      LABS:                        10.9   7.74  )-----------( 167      ( 06 Nov 2022 05:39 )             32.9     11-06    138  |  107  |  25<H>  ----------------------------<  224<H>  4.0   |  27  |  1.47<H>    Ca    8.3<L>      06 Nov 2022 05:39  Mg     2.2     11-05    TPro  8.6<H>  /  Alb  4.0  /  TBili  0.5  /  DBili  x   /  AST  121<H>  /  ALT  40  /  AlkPhos  73  11-05      Lipid Panel  Chl 127  HDL 64  LDL 52  Trg 56        Pro BNP  3081 11-05 @ 11:47      PT/INR - ( 05 Nov 2022 11:47 )   PT: 13.1 sec;   INR: 1.13 ratio    PTT - ( 05 Nov 2022 11:47 )  PTT:32.4 sec       72y old  Male who presents with a chief complaint of chest pain. Off and on for the last 2-3 days but worse last night and today AM. Came to ER and EKG showed ST elevations in the inferior leads. Successful LORENA PCI of the RCA.     Today, overall doing well. Hemodynamically stable. Pleuritic chest pain today. Worse on lying down. EKG shows no new changes.      PAST MEDICAL & SURGICAL HISTORY:  Stage 1 chronic kidney disease  H/O type 1 diabetes mellitus  HLD (hyperlipidemia)  HTN (hypertension)  H/O knee surgery  CURRENT CARDIAC WORKUP:       < from: Cardiac Catheterization (11.05.22 @ 12:27) >   Diagnostic Conclusions   One Vessel coronary artery disease (RCA) .   Normal left ventricularsystolic function with segmental wall motion   abnormalities. Estimated LV ejection fraction is 55 %.   Elevated left ventricular end-diastolic pressure 22 mm Hg.   No aortic valve stenosis.     Interventional Conclusions   Successful Coronary Intervention LORENA of proximal RCA.   Successful Coronary Intervention LORENA of mid RCA. Slow flow noted from   distal edge dissection, another distal stent placed.    < from: TTE Echo Complete w/o Contrast w/ Doppler (11.05.22 @ 15:22) >   The aortic valve is well visualized, appears mildly calcified. Valve opening seems to be normal.   Mild (1+) aortic regurgitation is present.   Normal appearing left atrium.   The left ventricle is normal in size, wall thickness,and contractility as seen in limited views. Estimated left ventricular ejection fraction is 55 %.   There appears to be hypokinesis of the mid /basal inferoseptal wall and mid/ basal inferior wall.   The IVC appears normal.   Mild dilatation of the ascending aortic segment measuring 4.0cm.   The mitral valve was well visualized.   The mitral valve leaflets appear thin and normal.   Mild mitral annular calcification is present.   Mild (1+) mitral regurgitation is present.   EA reversal of the mitral inflow consistent with reduced compliance of the left ventricle.   Trace pericardial effusion is present.   No evidence of pleural effusion.   Normal appearing pulmonic valve structure and function.   Normal appearing right atrium.   Normal appearing right ventricle structure and function.   The tricuspid valve leaflets are thin and pliable; valve motion is normal.   Mild (1+) tricuspid valve regurgitation is present.   No evidence pulmonary hypertension.      Allergies:   No Known Allergies      MEDICATIONS  (STANDING):  aspirin  chewable 81 milliGRAM(s) Oral daily  atorvastatin 80 milliGRAM(s) Oral at bedtime  clopidogrel Tablet 75 milliGRAM(s) Oral daily  dextrose 5%. 1000 milliLiter(s) (50 mL/Hr) IV Continuous <Continuous>  dextrose 5%. 1000 milliLiter(s) (100 mL/Hr) IV Continuous <Continuous>  dextrose 50% Injectable 25 Gram(s) IV Push once  dextrose 50% Injectable 12.5 Gram(s) IV Push once  dextrose 50% Injectable 25 Gram(s) IV Push once  glucagon  Injectable 1 milliGRAM(s) IntraMuscular once  insulin lispro (ADMELOG) corrective regimen sliding scale   SubCutaneous Before meals and at bedtime  losartan 25 milliGRAM(s) Oral daily  metoprolol tartrate 12.5 milliGRAM(s) Oral two times a day  pantoprazole    Tablet 40 milliGRAM(s) Oral before breakfast    MEDICATIONS  (PRN):  dextrose Oral Gel 15 Gram(s) Oral once PRN Blood Glucose LESS THAN 70 milliGRAM(s)/deciliter  melatonin 5 milliGRAM(s) Oral at bedtime PRN Insomnia        Vital Signs Last 24 Hrs  T(C): 37.1 (07 Nov 2022 08:00), Max: 37.1 (07 Nov 2022 08:00)  T(F): 98.7 (07 Nov 2022 08:00), Max: 98.7 (07 Nov 2022 08:00)  HR: 79 (07 Nov 2022 08:00) (72 - 98)  BP: 110/54 (07 Nov 2022 08:00) (85/56 - 122/63)  BP(mean): 68 (07 Nov 2022 08:00) (51 - 95)  RR: 16 (07 Nov 2022 06:00) (12 - 16)  SpO2: 97% (07 Nov 2022 06:00) (97% - 98%)    Parameters below as of 07 Nov 2022 06:00  Patient On (Oxygen Delivery Method): room air        PHYSICAL EXAM:    General:                Comfortable, AAO X 3, in no distress.   HEENT:                  Atraumatic, PERRLA, EOMI, conjunctiva clear.   Neck:                     Supple, no adenopathy, no thyromegaly, no JVD, no bruit.  Chest:                    Clear, B/L symmetric air entry, no tachypnea  Heart:                     S1, S2 normal, no gallop, no murmur.  Abdomen:              Soft, non-tender, bowel sounds active. No palpable masses.  Extremities:           no cyanosis, no edema. Peripheral pulses normal.  Skin:                      Skin color, texture normal. No rashes.  Neurologic:            Grossly nonfocal.       TELEMETRY:   Normal sinus rhythm with no tachy or osmin events     ECG:   Sinus. Inferior Q waves, ST elevation      LABS:                        10.9   7.74  )-----------( 167      ( 06 Nov 2022 05:39 )             32.9     11-06    138  |  107  |  25<H>  ----------------------------<  224<H>  4.0   |  27  |  1.47<H>    Ca    8.3<L>      06 Nov 2022 05:39  Mg     2.2     11-05    TPro  8.6<H>  /  Alb  4.0  /  TBili  0.5  /  DBili  x   /  AST  121<H>  /  ALT  40  /  AlkPhos  73  11-05      Lipid Panel  Chl 127  HDL 64  LDL 52  Trg 56        Pro BNP  3081 11-05 @ 11:47      PT/INR - ( 05 Nov 2022 11:47 )   PT: 13.1 sec;   INR: 1.13 ratio    PTT - ( 05 Nov 2022 11:47 )  PTT:32.4 sec

## 2022-11-07 NOTE — H&P ADULT - NSHPPHYSICALEXAM_GEN_ALL_CORE
PHYSICAL EXAM:    Constitutional: well-developed; well-groomed; well-nourished; no distress,  Eyes: PERRL; EOMI  ENMT: Normal oropharnxy, no oral lesions, no erythema, no exudates  Neck:  Supple; no JVD; normal thyroid gland  MSK/Back: normal shape; ROM intact; strength intact. Normal strength in all ext, No joint swelling, no joint tenderenss, no joint erythema, no effusions  Respiratory: airway patent; breath sounds equal; good air movement, no wheezing, no crackles, no rhonchi. no increase in WOB  Cardiovascular: regular rate and rhythm  no rub , no murmur, no gallops.   Gastrointestinal: soft; no distention, normal BS, no TTP, no organomegaly, no ascites.  Extremities: no clubbing; no cyanosis; no pedal edema, non-tender to palpation, DP and Radial pulses intact.  Neurological: alert and oriented x 3; responds to pain; responds to verbal commands; sensation intact: CN nerves grossly intact.   Skin: warm and dry; color normal: no rash: no ulcers  Psychiatric: Calm, no SI/HI

## 2022-11-07 NOTE — H&P ADULT - ASSESSMENT
The patient is a 72 Y.O. Male with pmh of HTN, T2DM insulin dependent, RA on hydroxychloroquine who presents to the ED for STEMI. Patient was found to have a RCA obstruction with inferior wall MI s/p PCI x3    # IWSTEMI s/p stents  # HTN  # RA  # Pericarditis  # T2DM  # CKD  - Patient s/p PCI x3 to the RCA, briefly chest pain overnight 2/2 to pericarditis, starting colchicine per cards  - C/W DAPT, high dose statins, BB, ARB  - TTE with hypokinesis of the mid /basal inferoseptal wall and mid/ basal inferior wall.  - Trops maxed at 69K, now down trending  - Sugars well controlled, is on higher insulin requirements at home  - CKD near basline, meds renally dosed  - Restart hydroxychloroquine  - DVT ppx with SCD's    - Will need outpatient cardiology follow up and cardiac rehab.

## 2022-11-07 NOTE — DISCHARGE NOTE PROVIDER - NSDCMRMEDTOKEN_GEN_ALL_CORE_FT
Cialis 5 mg oral tablet: 1 tab(s) orally once a day  Cozaar 25 mg oral tablet: 1 tab(s) orally once a day  Fish Oil oral capsule: 1 cap(s) orally once a day  Lantus Solostar Pen 100 units/mL subcutaneous solution: 6 unit(s) subcutaneous once a day  Lipitor 10 mg oral tablet: 1 tab(s) orally once a day  NovoLOG FlexPen 100 units/mL injectable solution: 8 unit(s) injectable 3 times a day  omeprazole 40 mg oral delayed release capsule: 1 cap(s) orally once a day  Plaquenil 200 mg oral tablet: 1 tab(s) orally once a day  Tylenol PM 1000 mg-50 mg/30 mL oral liquid: 30 milliliter(s) orally once a day (at bedtime)  Vitamin D3 25 mcg (1000 intl units) oral capsule: 1 cap(s) orally once a day   aspirin 81 mg oral tablet, chewable: 1 tab(s) orally once a day  atorvastatin 80 mg oral tablet: 1 tab(s) orally once a day (at bedtime)  clopidogrel 75 mg oral tablet: 1 tab(s) orally once a day  colchicine 0.6 mg oral tablet: 1 tab(s) orally 2 times a day  Cozaar 25 mg oral tablet: 1 tab(s) orally once a day  Fish Oil oral capsule: 1 cap(s) orally once a day  Lantus Solostar Pen 100 units/mL subcutaneous solution: 6 unit(s) subcutaneous once a day  Metoprolol Tartrate 25 mg oral tablet: 0.5 tab(s) orally 2 times a day   NovoLOG FlexPen 100 units/mL injectable solution: 8 unit(s) injectable 3 times a day  omeprazole 40 mg oral delayed release capsule: 1 cap(s) orally once a day  Plaquenil 200 mg oral tablet: 1 tab(s) orally once a day  Vitamin D3 25 mcg (1000 intl units) oral capsule: 1 cap(s) orally once a day

## 2022-11-13 DIAGNOSIS — I21.11 ST ELEVATION (STEMI) MYOCARDIAL INFARCTION INVOLVING RIGHT CORONARY ARTERY: ICD-10-CM

## 2022-11-13 DIAGNOSIS — I25.119 ATHEROSCLEROTIC HEART DISEASE OF NATIVE CORONARY ARTERY WITH UNSPECIFIED ANGINA PECTORIS: ICD-10-CM

## 2022-11-13 DIAGNOSIS — I31.9 DISEASE OF PERICARDIUM, UNSPECIFIED: ICD-10-CM

## 2022-11-13 DIAGNOSIS — N17.9 ACUTE KIDNEY FAILURE, UNSPECIFIED: ICD-10-CM

## 2022-11-13 DIAGNOSIS — I12.9 HYPERTENSIVE CHRONIC KIDNEY DISEASE WITH STAGE 1 THROUGH STAGE 4 CHRONIC KIDNEY DISEASE, OR UNSPECIFIED CHRONIC KIDNEY DISEASE: ICD-10-CM

## 2022-11-13 DIAGNOSIS — Z79.4 LONG TERM (CURRENT) USE OF INSULIN: ICD-10-CM

## 2022-11-13 DIAGNOSIS — K21.9 GASTRO-ESOPHAGEAL REFLUX DISEASE WITHOUT ESOPHAGITIS: ICD-10-CM

## 2022-11-13 DIAGNOSIS — E11.22 TYPE 2 DIABETES MELLITUS WITH DIABETIC CHRONIC KIDNEY DISEASE: ICD-10-CM

## 2022-11-13 DIAGNOSIS — N18.1 CHRONIC KIDNEY DISEASE, STAGE 1: ICD-10-CM

## 2022-11-13 DIAGNOSIS — M06.9 RHEUMATOID ARTHRITIS, UNSPECIFIED: ICD-10-CM

## 2023-04-06 NOTE — H&P ADULT - CLICK TO LAUNCH ORM
Acute exacerbation likely secondary to bronchitis/LRTI based on history and exam.  Secondary to exam I will change oral steroid from Medrol Dosepak to extended prednisone taper and obtain a chest x-ray to evaluate for any effusions or consolidations. Patient with borderline SPO2 today in the office on room air. He was instructed to go to the emergency room for any acutely worsening dyspnea at rest or decreased activity tolerance associated with any fever/chills/sweats, nausea/vomiting, chest pain, lightheadedness, dizziness, worsening lower extremity edema, or syncope. We will keep close follow-up patient was instructed to finish full course of azithromycin and keep close follow-up in the next 5 to 10 days for reassessment. .

## 2023-08-11 NOTE — ED ADULT TRIAGE NOTE - HEIGHT IN CM
182.88
Pt states he woke up feeling dizzy today, was walking around and felt "unsteady like I was drunk," fell backwards and hit the back of his head. On Plavix. Denies LOC, vision changes, vomiting. LKW [id number].

## 2024-07-18 NOTE — ASU PREOP CHECKLIST - WEIGHT IN KG
Left voice message that we need another urinalysis for lab for microalbumin something happened to specimen at lab not urgent.   86.2